# Patient Record
Sex: FEMALE | URBAN - METROPOLITAN AREA
[De-identification: names, ages, dates, MRNs, and addresses within clinical notes are randomized per-mention and may not be internally consistent; named-entity substitution may affect disease eponyms.]

---

## 2019-01-02 ENCOUNTER — EMERGENCY (EMERGENCY)
Facility: HOSPITAL | Age: 39
LOS: 1 days | Discharge: HOME | End: 2019-01-02
Attending: EMERGENCY MEDICINE | Admitting: EMERGENCY MEDICINE
Payer: COMMERCIAL

## 2019-01-02 VITALS
SYSTOLIC BLOOD PRESSURE: 131 MMHG | HEART RATE: 91 BPM | DIASTOLIC BLOOD PRESSURE: 76 MMHG | RESPIRATION RATE: 19 BRPM | TEMPERATURE: 98 F | HEIGHT: 62 IN | OXYGEN SATURATION: 96 % | WEIGHT: 205.03 LBS

## 2019-01-02 VITALS
HEART RATE: 91 BPM | DIASTOLIC BLOOD PRESSURE: 84 MMHG | SYSTOLIC BLOOD PRESSURE: 119 MMHG | RESPIRATION RATE: 16 BRPM | TEMPERATURE: 98 F | OXYGEN SATURATION: 98 %

## 2019-01-02 DIAGNOSIS — F43.20 ADJUSTMENT DISORDER, UNSPECIFIED: ICD-10-CM

## 2019-01-02 LAB
ALBUMIN SERPL ELPH-MCNC: 3.5 G/DL — SIGNIFICANT CHANGE UP (ref 3.3–5)
ALP SERPL-CCNC: 66 U/L — SIGNIFICANT CHANGE UP (ref 30–120)
ALT FLD-CCNC: 22 U/L DA — SIGNIFICANT CHANGE UP (ref 10–60)
ANION GAP SERPL CALC-SCNC: 13 MMOL/L — SIGNIFICANT CHANGE UP (ref 5–17)
APAP SERPL-MCNC: <1 UG/ML — SIGNIFICANT CHANGE UP (ref 10–30)
APPEARANCE UR: CLEAR — SIGNIFICANT CHANGE UP
AST SERPL-CCNC: 18 U/L — SIGNIFICANT CHANGE UP (ref 10–40)
BASOPHILS # BLD AUTO: 0.04 K/UL — SIGNIFICANT CHANGE UP (ref 0–0.2)
BASOPHILS NFR BLD AUTO: 0.6 % — SIGNIFICANT CHANGE UP (ref 0–2)
BILIRUB SERPL-MCNC: 0.3 MG/DL — SIGNIFICANT CHANGE UP (ref 0.2–1.2)
BILIRUB UR-MCNC: NEGATIVE — SIGNIFICANT CHANGE UP
BUN SERPL-MCNC: 8 MG/DL — SIGNIFICANT CHANGE UP (ref 7–23)
CALCIUM SERPL-MCNC: 9.1 MG/DL — SIGNIFICANT CHANGE UP (ref 8.4–10.5)
CHLORIDE SERPL-SCNC: 108 MMOL/L — SIGNIFICANT CHANGE UP (ref 96–108)
CO2 SERPL-SCNC: 23 MMOL/L — SIGNIFICANT CHANGE UP (ref 22–31)
COLOR SPEC: YELLOW — SIGNIFICANT CHANGE UP
CREAT SERPL-MCNC: 0.86 MG/DL — SIGNIFICANT CHANGE UP (ref 0.5–1.3)
DIFF PNL FLD: NEGATIVE — SIGNIFICANT CHANGE UP
EOSINOPHIL # BLD AUTO: 0.25 K/UL — SIGNIFICANT CHANGE UP (ref 0–0.5)
EOSINOPHIL NFR BLD AUTO: 3.5 % — SIGNIFICANT CHANGE UP (ref 0–6)
ETHANOL SERPL-MCNC: <3 MG/DL — SIGNIFICANT CHANGE UP (ref 0–3)
GLUCOSE SERPL-MCNC: 102 MG/DL — HIGH (ref 70–99)
GLUCOSE UR QL: NEGATIVE MG/DL — SIGNIFICANT CHANGE UP
HCG UR QL: NEGATIVE — SIGNIFICANT CHANGE UP
HCT VFR BLD CALC: 33.9 % — LOW (ref 34.5–45)
HGB BLD-MCNC: 11.7 G/DL — SIGNIFICANT CHANGE UP (ref 11.5–15.5)
IMM GRANULOCYTES NFR BLD AUTO: 0.3 % — SIGNIFICANT CHANGE UP (ref 0–1.5)
KETONES UR-MCNC: NEGATIVE — SIGNIFICANT CHANGE UP
LEUKOCYTE ESTERASE UR-ACNC: ABNORMAL
LYMPHOCYTES # BLD AUTO: 1.37 K/UL — SIGNIFICANT CHANGE UP (ref 1–3.3)
LYMPHOCYTES # BLD AUTO: 19.2 % — SIGNIFICANT CHANGE UP (ref 13–44)
MCHC RBC-ENTMCNC: 29.1 PG — SIGNIFICANT CHANGE UP (ref 27–34)
MCHC RBC-ENTMCNC: 34.5 GM/DL — SIGNIFICANT CHANGE UP (ref 32–36)
MCV RBC AUTO: 84.3 FL — SIGNIFICANT CHANGE UP (ref 80–100)
MONOCYTES # BLD AUTO: 0.41 K/UL — SIGNIFICANT CHANGE UP (ref 0–0.9)
MONOCYTES NFR BLD AUTO: 5.7 % — SIGNIFICANT CHANGE UP (ref 2–14)
NEUTROPHILS # BLD AUTO: 5.05 K/UL — SIGNIFICANT CHANGE UP (ref 1.8–7.4)
NEUTROPHILS NFR BLD AUTO: 70.7 % — SIGNIFICANT CHANGE UP (ref 43–77)
NITRITE UR-MCNC: NEGATIVE — SIGNIFICANT CHANGE UP
PCP SPEC-MCNC: SIGNIFICANT CHANGE UP
PH UR: 5 — SIGNIFICANT CHANGE UP (ref 5–8)
PLATELET # BLD AUTO: 260 K/UL — SIGNIFICANT CHANGE UP (ref 150–400)
POTASSIUM SERPL-MCNC: 4 MMOL/L — SIGNIFICANT CHANGE UP (ref 3.5–5.3)
POTASSIUM SERPL-SCNC: 4 MMOL/L — SIGNIFICANT CHANGE UP (ref 3.5–5.3)
PROT SERPL-MCNC: 7 G/DL — SIGNIFICANT CHANGE UP (ref 6–8.3)
PROT UR-MCNC: NEGATIVE MG/DL — SIGNIFICANT CHANGE UP
RBC # BLD: 4.02 M/UL — SIGNIFICANT CHANGE UP (ref 3.8–5.2)
RBC # FLD: 13.6 % — SIGNIFICANT CHANGE UP (ref 10.3–14.5)
SALICYLATES SERPL-MCNC: 1.3 MG/DL — LOW (ref 2.8–20)
SODIUM SERPL-SCNC: 144 MMOL/L — SIGNIFICANT CHANGE UP (ref 135–145)
SP GR SPEC: 1.01 — SIGNIFICANT CHANGE UP (ref 1.01–1.02)
UROBILINOGEN FLD QL: NEGATIVE MG/DL — SIGNIFICANT CHANGE UP
WBC # BLD: 7.14 K/UL — SIGNIFICANT CHANGE UP (ref 3.8–10.5)
WBC # FLD AUTO: 7.14 K/UL — SIGNIFICANT CHANGE UP (ref 3.8–10.5)

## 2019-01-02 PROCEDURE — 85027 COMPLETE CBC AUTOMATED: CPT

## 2019-01-02 PROCEDURE — 36415 COLL VENOUS BLD VENIPUNCTURE: CPT

## 2019-01-02 PROCEDURE — 93005 ELECTROCARDIOGRAM TRACING: CPT

## 2019-01-02 PROCEDURE — 90792 PSYCH DIAG EVAL W/MED SRVCS: CPT | Mod: GT

## 2019-01-02 PROCEDURE — 80307 DRUG TEST PRSMV CHEM ANLYZR: CPT

## 2019-01-02 PROCEDURE — 81001 URINALYSIS AUTO W/SCOPE: CPT

## 2019-01-02 PROCEDURE — 93010 ELECTROCARDIOGRAM REPORT: CPT

## 2019-01-02 PROCEDURE — 99285 EMERGENCY DEPT VISIT HI MDM: CPT

## 2019-01-02 PROCEDURE — 81025 URINE PREGNANCY TEST: CPT

## 2019-01-02 PROCEDURE — 80053 COMPREHEN METABOLIC PANEL: CPT

## 2019-01-02 NOTE — ED BEHAVIORAL HEALTH ASSESSMENT NOTE - DESCRIPTION
hernated disc, obesity single, employed Consult called in at 18:24. Patient in ED for 2 hours and 41 minutes prior to Telepsych consult. Per RN Riya, patient arrived at 15:43 dressed appropriately for the weather, is neat, clean and unaccompanied by family or social supports. Her mother arrived around 19:24. Per nurse patient reports she is annoyed and embarrassed that her friends called the police to her job. She cooperated and tolerated all triage protocols. Patient allowed security to wand her and secure her belongings. She changed into a hospital gown without force or security. Per Riya, patient was compliant with routine labs and required urine specimens. She has not eaten or slept. She is engaging with staff appropriately. No agitation or severe symptoms of psychosis noted. She denies delusions and hallucinations. She denies HI and SI. She appears euthymic with a mood congruent affect. Her speech is clear with a linear thought content. She is able to convey and have her needs met. No additional security, restraints or PRN medications were required during her ED visit.     Collateral was obtained from mother by [facility]:  Collateral information provided by, mother, Monica [phone], daily contact, reliability is high. Per mother the HPI starts on New Years Sofya and progressed into this afternoon. Per mother, patient went away this weekend to [place] for a davi event with her best friends. On New Years Sofya she drank an excessive amount of alcohol and also takes Zoloft and Oxycodone. She said a few things to her friends that were hurtful. She also sent mean text messages to her friends in a group chat. When she came back home on Monday, there were a few more messages that were sent to her friends in the context of “maybe she wouldn’t be a burden on them”. This morning, she posted seemingly cryptic messages on Facebook in the context of she will never see her friends again. Her friends became concerned and contacted the local police, who eventually went to her job. She has weekly appointments with her psychiatrist, Dr. Aranza Vicente [phone], and has an upcoming appointment next week.     Baseline symptoms include, mild depression, euthymic mood and good insight/judgement. There is no family history of mental illness or substance abuse. No , legal, trauma or abuse history. She lives with her parents and younger brother. 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 Her mother has no significant safety concerns and is amendable to a discharge home.

## 2019-01-02 NOTE — ED ADULT NURSE NOTE - CHIEF COMPLAINT QUOTE
Pt is brought in by EMS and escorted by police for SI, patient is denying SI at this time, hx of depression and anxiety.   as per patient, patient had quarrel with her friends and friend heard that patient said that she liked to hurt herself.

## 2019-01-02 NOTE — ED BEHAVIORAL HEALTH ASSESSMENT NOTE - DIFFERENTIAL
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

## 2019-01-02 NOTE — ED BEHAVIORAL HEALTH ASSESSMENT NOTE - OTHER PAST PSYCHIATRIC HISTORY (INCLUDE DETAILS REGARDING ONSET, COURSE OF ILLNESS, INPATIENT/OUTPATIENT TREATMENT)
depression, anxiety, skin picking, no prior admission/rehab.  Currently in outpatient treatment at [facility] with Dr. Rosmery Vicente

## 2019-01-02 NOTE — ED PROVIDER NOTE - MEDICAL DECISION MAKING DETAILS
39 y/o F with hx of anxiety, depression bib police for psych evaluation s/p fight with friend and made comment "I won't be a burden to you" and called  on pt, denies any complaints at this time, denies SI/HI/drug abuse/hallucinations, will get psych clearance labs, telepsych consult, re-assess

## 2019-01-02 NOTE — ED BEHAVIORAL HEALTH ASSESSMENT NOTE - SUMMARY
37 y/o  female domiciled, single, with no prior psych admission, no legal history, PPH of depression, anxiety, skin picking d/o being followed by Dr. Eliseo Vicente at [facility], [facility] police for psychiatric evaluation concerning for suicidal ideation. Patient is cooperative and well related during interview, states she has never had SI, currently in treatment, denied SI/HI/AVH, FB post was misinterpreted by friend in [place] who called police.  She has no prior SA, currently future oriented, mother's collateral suggested no acute safety concern.  Patient does not meet criteria for inpatient psych admission and will benefit from continuing outpatient care and current meds

## 2019-01-02 NOTE — ED BEHAVIORAL HEALTH ASSESSMENT NOTE - HPI (INCLUDE ILLNESS QUALITY, SEVERITY, DURATION, TIMING, CONTEXT, MODIFYING FACTORS, ASSOCIATED SIGNS AND SYMPTOMS)
37 y/o  female domiciled, single, with no prior psych admission, no legal history, PPH of depression, anxiety, skin picking d/o being followed by Dr. Eliseo Vicente at [facility], [facility] police for psychiatric evaluation concerning for suicidal ideation.   Patient is seen and interviewed, she is calm and cooperative during evaluation.  She states she was at a 3-day davi event with her friends at a hotel in [place] until yesterday, during which she and her friends drank a lot and there was some argument with her friends.  Patient states she felt pissed and no longer thought her friends and her are no longer feeling the same and was disappointed.  While at work in [facility] [facility] today, she described posting, "I have wonderful friends but this might be the last time we were together."  Patient states her best friend who saw the post, texted her friend in [place] who called her cell phone around 1pm today and asked if she was ok, patient told her she was ok but due to work duty she could not talk to her friend further.  Police was called who initially arrived her home in [facility] then [place] police went to her work place and brought her to ED in [place].  Patient states feeling upset now because she does not believe there is no need for her to be in the ED.  She states that she was in depression about 1 year ago due to bad breakup and started seeing Dr. Eliseo Vicente at [facility] weekly for medication management and psychotherapy.  She states there is significant improvement in depressed mood, anxiety, and skin picking symptoms since treatment started.  She admits to have fair sleep, appetite, energy, and concentration.  Patient denied Si/HI/AVH/paranoia/manic symptoms.  Patient denied history of SA, has occasional alcohol use, but denied cigarette smoking/other substance use, she takes pain medication as per istop report documented in a previous note. 39 y/o  female domiciled, single, with no prior psych admission, no legal history, PPH of depression, anxiety, skin picking d/o being followed by Dr. Eliseo Vicente at [facility] on Xanax 1mg bid (ISTOP Reference #: [id number]) and zoloft 250mg daily , bib police for psychiatric evaluation concerning for suicidal ideation.   Patient is seen and interviewed, she is calm and cooperative during evaluation.  She states she was at a 3-day davi event with her friends at a hotel in [place] until yesterday, during which she and her friends drank a lot and there was some argument with her friends.  Patient states she felt pissed and no longer thought her friends and her are no longer feeling the same and was disappointed.  While at work in [facility] today, she described posting, "I have wonderful friends but this might be the last time we were together."  Patient states her best friend who saw the post, texted her friend in [place] who called her cell phone around 1pm today and asked if she was ok, patient told her she was ok but due to work duty she could not talk to her friend further.  Police was called who initially arrived her home in [facility] then [place] police went to her work place and brought her to ED in [place].  Patient states feeling upset now because she does not believe there is no need for her to be in the ED.  She states that she was in depression about 1 year ago due to bad breakup and started seeing Dr. Eliseo Vicente at [facility] weekly for medication management and psychotherapy.  She states there is significant improvement in depressed mood, anxiety, and skin picking symptoms since treatment started.  She admits to have fair sleep, appetite, energy, and concentration.  Patient denied Si/HI/AVH/paranoia/manic symptoms.  Patient denied history of SA, has occasional alcohol use, but denied cigarette smoking/other substance use, she takes pain medication as per istop report documented in a previous note.

## 2019-01-02 NOTE — ED PROVIDER NOTE - OBJECTIVE STATEMENT
37 y/o F with hx of anxiety and depression bib police for psychiatric evaluation. Pt states that she was away with her friends this weekend, got into a quarrel with a friend and told her friend "I won't be a burden to you" and she called her friend in [place] who called the  on her. 37 y/o F with hx of anxiety and depression bib police for psychiatric evaluation. Pt states that she was away with her friends this weekend, got into a quarrel with a friend and told her friend "I won't be a burden to you" and she called her friend in [place] who called the  on her. States that she has been compliant with her meds. Denies any suicidal plans or attempts, SI, drug abuse, hallucinations, drug abuse or hx of psychiatric admissions. States that she has appt to see her psychiatrist on Monday.

## 2019-01-02 NOTE — ED PROVIDER NOTE - CARE PLAN
Principal Discharge DX:	Depression  Secondary Diagnosis:	Anxiety Principal Discharge DX:	Adjustment disorder

## 2019-01-02 NOTE — ED BEHAVIORAL HEALTH NOTE - BEHAVIORAL HEALTH NOTE
ISTOP Reference #: [id number]     12/21/2018 12/27/2018 tramadol hcl 50 mg tablet  120 30 Pamela Weissndra     12/21/2018 12/21/2018 oxycodone hcl 5 mg tablet  90 30 Pamela Weissndra     12/10/2018 12/16/2018 alprazolam 1 mg tablet  60 30 Kyle Cintron MD     11/23/2018 11/28/2018 tramadol hcl 50 mg tablet  120 30 Abhishek Blank     11/23/2018 11/23/2018 oxycodone hcl 5 mg tablet  90 30 Abhishek Blank     11/08/2018 11/08/2018 alprazolam 1 mg tablet  60 30 Kyle Cintron MD     11/02/2018 11/03/2018 alprazolam 1 mg tablet  10 5 Jd Dejesus (MD)     10/26/2018 11/01/2018 tramadol hcl 50 mg tablet  120 30 Abhishek Blank     10/26/2018 10/26/2018 oxycodone hcl 5 mg tablet  90 30 Abhishek Blank     09/29/2018 10/02/2018 tramadol hcl 50 mg tablet  120 30 Abhishek Blank     10/01/2018 10/02/2018 alprazolam 1 mg tablet  60 30 Kyle Cintron MD     09/29/2018 09/29/2018 oxycodone hcl 5 mg tablet  90 30 Abhishek Blank     08/27/2018 09/05/2018 alprazolam 1 mg tablet  75 30 Kyle Cintron MD     09/01/2018 09/05/2018 tramadol hcl 50 mg tablet  120 30 Abhishek Blank     09/01/2018 09/01/2018 oxycodone hcl 5 mg tablet  90 30 Abhishek Blank     08/03/2018 08/08/2018 tramadol hcl 50 mg tablet  120 30 Cecilio Lainez MD     08/06/2018 08/08/2018 alprazolam 1 mg tablet  75 30 Kyle Cintron MD     08/03/2018 08/04/2018 oxycodone hcl 5 mg tablet  60 30 Cecilio Lainez MD     07/07/2018 07/11/2018 tramadol hcl 50 mg tablet  120 30 Cecilio Lainez MD     07/09/2018 07/11/2018 alprazolam 1 mg tablet  90 30 Jd Dejesus (MD)     07/07/2018 07/07/2018 oxycodone hcl 5 mg tablet  120 30 Cecilio Lainez MD     06/09/2018 06/14/2018 tramadol hcl 50 mg tablet  120 30 [place], [facility]     06/11/2018 06/14/2018 alprazolam 1 mg tablet  90 30 Kyle Cintron MD     06/09/2018 06/09/2018 oxycodone hcl 5 mg tablet  60 30 Cardello, [facility]     05/12/2018 05/17/2018 tramadol hcl 50 mg tablet  120 30 Cardello, Blank     05/12/2018 05/17/2018 alprazolam 1 mg tablet  90 30 Cardello, Blank     05/12/2018 05/12/2018 oxycodone hcl 5 mg tablet  60 30 [place], [facility] [place]     04/12/2018 04/20/2018 tramadol hcl 50 mg tablet  90 30 Cardello, Blank     04/12/2018 04/20/2018 alprazolam 1 mg tablet  90 30 Card[place], [facility]     04/12/2018 04/13/2018 oxycodone hcl 5 mg tablet  60 30 Cardello, [facility]     03/17/2018 03/23/2018 alprazolam 1 mg tablet  90 30 Cardello, [facility]     03/17/2018 03/23/2018 tramadol hcl 50 mg tablet  90 30 Cardello, [facility]     03/17/2018 03/17/2018 oxycodone hcl 5 mg tablet  60 30 Cardello, [facility]     02/17/2018 02/24/2018 tramadol hcl 50 mg tablet  90 30 Cardello, [facility]     02/17/2018 02/24/2018 alprazolam 1 mg tablet  90 30 Cardello, [facility]     02/17/2018 02/17/2018 oxycodone hcl 5 mg tablet  30 30 [place], [facility] [place]     01/20/2018 01/27/2018 tramadol hcl 50 mg tablet  90 30 [place], [facility] [place]     01/20/2018 01/27/2018 alprazolam 1 mg tablet  90 30 Card[place], [place]     01/20/2018 01/20/2018 oxycodone hcl 5 mg tablet  30 30 [place], [facility] [place]

## 2019-01-02 NOTE — ED BEHAVIORAL HEALTH ASSESSMENT NOTE - RISK ASSESSMENT
risk:  alcohol use, h/o depression, anxiety, skin picking  protective factor:  no h/o SA, no acccess to firearm, no h/o admission, has family support, stable housing, female gender, employment

## 2019-01-02 NOTE — ED ADULT NURSE NOTE - NSIMPLEMENTINTERV_GEN_ALL_ED
Implemented All Universal Safety Interventions:  [place] to call system. Call bell, personal items and telephone within reach. Instruct patient to call for assistance. Room bathroom lighting operational. Non-slip footwear when patient is off stretcher. Physically safe environment: no spills, clutter or unnecessary equipment. Stretcher in lowest position, wheels locked, appropriate side rails in place.

## 2019-01-02 NOTE — ED PROVIDER NOTE - PROGRESS NOTE DETAILS
Pt examined by ED attending, Dr. Valdes who agreed with disposition and plan. Spoke to telepsychiatrist, will see pt shortly for consult. Spoke to psychiatrist, advised pt is cleared for discharge home, continue home meds and f/u psychiatrist on Monday.

## 2019-01-02 NOTE — ED BEHAVIORAL HEALTH ASSESSMENT NOTE - DESCRIPTION (FIRST USE, LAST USE, QUANTITY, FREQUENCY, DURATION)
most recently 6 drinks/day over the last 3 days, prior drinking episode was almost 1 year ago prescription opiate for pain prescription Xanax prescribed by psychiatrist
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
## 2019-01-02 NOTE — ED ADULT NURSE NOTE - OBJECTIVE STATEMENT
38 yr. old female brought to ED after an argument with friends. Friends state patient made statements stating she wanted to hurt herself.  Patient denies suicidal and/or homicidal ideation.

## 2019-01-02 NOTE — ED PROVIDER NOTE - ATTENDING CONTRIBUTION TO CARE
I, Dr Valdes, have personally performed a face to face diagnostic evaluation on this patient with the PA/NP. I have reviewed the PA/NP's note and agree with the history, Physical exam and plan of care, As per history 37 y/o F with hx of anxiety and depression bib police for psychiatric evaluation. Pt states that she was away with her friends this weekend, got into a quarrel with a friend and told her friend "I won't be a burden to you" and she called her friend in [place] who called the  on her. States that she has been compliant with her meds. Denies any suicidal plans or attempts, SI, drug abuse, hallucinations, drug abuse or hx of psychiatric admissions. States that she has appt to see her psychiatrist on Monday. On exam patient look anxious not in any acute distress seen and evaluated by our psych who clear patient for discharge to fallow up in the clinic

## 2019-01-02 NOTE — ED BEHAVIORAL HEALTH ASSESSMENT NOTE - SUICIDE PROTECTIVE FACTORS
Responsibility to family and others/Identifies reasons for living/Future oriented/Engaged in work or school/Positive therapeutic relationships

## 2025-03-11 ENCOUNTER — OFFICE (OUTPATIENT)
Facility: LOCATION | Age: 45
Setting detail: OPHTHALMOLOGY
End: 2025-03-11
Payer: MEDICAID

## 2025-03-11 DIAGNOSIS — H02.403: ICD-10-CM

## 2025-03-11 DIAGNOSIS — H35.443: ICD-10-CM

## 2025-03-11 PROCEDURE — 99203 OFFICE O/P NEW LOW 30 MIN: CPT | Performed by: OPHTHALMOLOGY

## 2025-03-11 PROCEDURE — 92083 EXTENDED VISUAL FIELD XM: CPT | Performed by: OPHTHALMOLOGY

## 2025-03-11 ASSESSMENT — TONOMETRY
OS_IOP_MMHG: 13
OD_IOP_MMHG: 13

## 2025-03-11 ASSESSMENT — CONFRONTATIONAL VISUAL FIELD TEST (CVF)
OD_FINDINGS: FULL
OS_FINDINGS: FULL

## 2025-03-11 ASSESSMENT — LID POSITION - PTOSIS
OD_PTOSIS: 1+
OS_PTOSIS: 1+

## 2025-03-12 PROBLEM — H31.29 PERIPAPILLARY ATROPHY ; BOTH EYES: Status: ACTIVE | Noted: 2025-03-11

## 2025-03-12 ASSESSMENT — REFRACTION_AUTOREFRACTION
OS_SPHERE: -9.75
OS_CYLINDER: +0.50
OD_CYLINDER: SPH
OD_SPHERE: -9.25
OS_AXIS: 53

## 2025-03-12 ASSESSMENT — VISUAL ACUITY
OD_BCVA: 20/20
OS_BCVA: 20/20

## 2025-06-01 NOTE — ED ADULT NURSE NOTE - BEHAVIOR
Please follow up with your primary care doctor:  Matilda Shirley MD   [address] [place] [address] / [facility] [id number] [phone]      1: Go immediately to [facility] emergency department for further evaluation of your headache, shortness of breath and lightheadedness   Cooperative

## 2025-07-28 ENCOUNTER — APPOINTMENT (OUTPATIENT)
Dept: PHYSICAL MEDICINE AND REHAB | Facility: CLINIC | Age: 45
End: 2025-07-28
Payer: MEDICAID

## 2025-07-28 VITALS — HEIGHT: 62 IN | BODY MASS INDEX: 43.79 KG/M2 | WEIGHT: 238 LBS

## 2025-07-28 DIAGNOSIS — Z86.39 PERSONAL HISTORY OF OTHER ENDOCRINE, NUTRITIONAL AND METABOLIC DISEASE: ICD-10-CM

## 2025-07-28 DIAGNOSIS — M89.8X7 OTHER SPECIFIED DISORDERS OF BONE, ANKLE AND FOOT: ICD-10-CM

## 2025-07-28 DIAGNOSIS — M19.90 UNSPECIFIED OSTEOARTHRITIS, UNSPECIFIED SITE: ICD-10-CM

## 2025-07-28 PROBLEM — Z00.00 ENCOUNTER FOR PREVENTIVE HEALTH EXAMINATION: Status: ACTIVE | Noted: 2025-07-28

## 2025-07-28 PROCEDURE — 99204 OFFICE O/P NEW MOD 45 MIN: CPT | Mod: 25

## 2025-07-28 PROCEDURE — 73630 X-RAY EXAM OF FOOT: CPT | Mod: RT

## 2025-07-28 RX ORDER — PANTOPRAZOLE 40 MG/1
TABLET, DELAYED RELEASE ORAL
Refills: 0 | Status: ACTIVE | COMMUNITY

## 2025-07-28 RX ORDER — ARIPIPRAZOLE 2 MG/1
TABLET ORAL
Refills: 0 | Status: ACTIVE | COMMUNITY

## 2025-07-28 RX ORDER — BACLOFEN 15 MG/1
TABLET ORAL
Refills: 0 | Status: ACTIVE | COMMUNITY

## 2025-07-28 RX ORDER — LEVOTHYROXINE SODIUM 0.17 MG/1
TABLET ORAL
Refills: 0 | Status: ACTIVE | COMMUNITY

## 2025-07-28 RX ORDER — MELOXICAM 15 MG/1
15 TABLET ORAL
Qty: 10 | Refills: 0 | Status: COMPLETED | COMMUNITY
Start: 2025-07-28 | End: 2025-08-07

## 2025-07-28 RX ORDER — MONTELUKAST 10 MG/1
TABLET, FILM COATED ORAL
Refills: 0 | Status: ACTIVE | COMMUNITY

## 2025-07-28 RX ORDER — ROSUVASTATIN CALCIUM 5 MG/1
TABLET, FILM COATED ORAL
Refills: 0 | Status: ACTIVE | COMMUNITY

## 2025-07-28 RX ORDER — SERTRALINE HYDROCHLORIDE 25 MG/1
TABLET, FILM COATED ORAL
Refills: 0 | Status: ACTIVE | COMMUNITY

## 2025-08-11 ENCOUNTER — NON-APPOINTMENT (OUTPATIENT)
Age: 45
End: 2025-08-11